# Patient Record
Sex: MALE | Race: WHITE | Employment: FULL TIME | URBAN - METROPOLITAN AREA
[De-identification: names, ages, dates, MRNs, and addresses within clinical notes are randomized per-mention and may not be internally consistent; named-entity substitution may affect disease eponyms.]

---

## 2020-05-06 ENCOUNTER — TELEMEDICINE (OUTPATIENT)
Dept: GASTROENTEROLOGY | Facility: CLINIC | Age: 35
End: 2020-05-06

## 2020-05-06 DIAGNOSIS — K51.90 ULCERATIVE COLITIS WITHOUT COMPLICATIONS, UNSPECIFIED LOCATION (HCC): Primary | ICD-10-CM

## 2020-05-06 PROCEDURE — 99203 OFFICE O/P NEW LOW 30 MIN: CPT | Performed by: INTERNAL MEDICINE

## 2020-05-07 NOTE — TELEPHONE ENCOUNTER
Please contact the patient to schedule a colonoscopy for chronic ulcerative colitis following a split dose Suprep (I have pended the order as I neglected to ask the patient his preferred pharmacy) and either IV sedation or monitored anesthesia care per riki

## 2020-05-07 NOTE — PATIENT INSTRUCTIONS
1.  We will contact you to schedule a colonoscopy for a history of chronic ulcerative colitis. 2.  Please obtain blood work at your convenience (ordered).

## 2020-05-07 NOTE — PROGRESS NOTES
KEITH Amb Video Visit    The patient verbally consents to an ambulatory video visit and understands and accepts financial responsibility for any deductible, co-insurance and/or co-pays associated with this service.     This visit is conducted using Tele[employer] heartburn. He has absolutely no abdominal pain. He typically has 2-3 formed bowel movements daily without bleeding. He is able to pass flatus without fear of incontinence.     Past medical history:  Ulcerative colitis  Drug-induced pancreatitis (6-MP) performed was discussed as well. Decision regarding subsequent surveillance to be based on the results of the index colonoscopy. This will be arranged following a split dose Suprep and either IV sedation or monitored anesthesia care per scheduling.     AS

## 2020-05-12 NOTE — TELEPHONE ENCOUNTER
Scheduled for:  Colonoscopy - [id number]  Provider Name:  Dr. Clinton Chacon  Date:  6/22/20  Location:  [facility]  Sedation:  IV  Time:  10:45 am (pt is aware to arrive at 9:45 am)  Prep:  Suprep, Prep instructions were given to pt over the phone, pt verbalized unders

## 2020-06-11 ENCOUNTER — TELEPHONE (OUTPATIENT)
Dept: GASTROENTEROLOGY | Facility: CLINIC | Age: 35
End: 2020-06-11

## 2020-06-11 NOTE — TELEPHONE ENCOUNTER
Overdue reminder letter mailed.     Labs:   C-REACTIVE PROTEIN    CBC WITH DIFFERENTIAL WITH PLATELET    COMP METABOLIC PANEL (14)    SED RATE, WESTERGREN (AUTOMATED)

## 2020-06-15 ENCOUNTER — APPOINTMENT (OUTPATIENT)
Dept: LAB | Facility: HOSPITAL | Age: 35
End: 2020-06-15
Attending: INTERNAL MEDICINE
Payer: COMMERCIAL

## 2020-06-15 DIAGNOSIS — K51.90 ULCERATIVE COLITIS WITHOUT COMPLICATIONS, UNSPECIFIED LOCATION (HCC): ICD-10-CM

## 2020-06-15 PROCEDURE — 85652 RBC SED RATE AUTOMATED: CPT

## 2020-06-15 PROCEDURE — 36415 COLL VENOUS BLD VENIPUNCTURE: CPT

## 2020-06-15 PROCEDURE — 86140 C-REACTIVE PROTEIN: CPT

## 2020-06-15 PROCEDURE — 80053 COMPREHEN METABOLIC PANEL: CPT

## 2020-06-15 PROCEDURE — 85025 COMPLETE CBC W/AUTO DIFF WBC: CPT

## 2020-06-19 ENCOUNTER — LAB ENCOUNTER (OUTPATIENT)
Dept: LAB | Facility: HOSPITAL | Age: 35
End: 2020-06-19
Attending: INTERNAL MEDICINE
Payer: COMMERCIAL

## 2020-06-19 DIAGNOSIS — K51.90 ULCERATIVE COLITIS WITHOUT COMPLICATIONS, UNSPECIFIED LOCATION (HCC): ICD-10-CM

## 2020-06-19 DIAGNOSIS — Z01.818 PRE-OP TESTING: ICD-10-CM

## 2020-06-22 ENCOUNTER — HOSPITAL ENCOUNTER (OUTPATIENT)
Facility: HOSPITAL | Age: 35
Setting detail: HOSPITAL OUTPATIENT SURGERY
Discharge: HOME | End: 2020-06-22
Attending: INTERNAL MEDICINE | Admitting: INTERNAL MEDICINE
Payer: COMMERCIAL

## 2020-06-22 VITALS
HEART RATE: 68 BPM | HEIGHT: 72 IN | BODY MASS INDEX: 29.12 KG/M2 | WEIGHT: 215 LBS | DIASTOLIC BLOOD PRESSURE: 68 MMHG | SYSTOLIC BLOOD PRESSURE: 106 MMHG | OXYGEN SATURATION: 98 % | RESPIRATION RATE: 16 BRPM

## 2020-06-22 DIAGNOSIS — K51.90 ULCERATIVE COLITIS WITHOUT COMPLICATIONS, UNSPECIFIED LOCATION (HCC): Primary | ICD-10-CM

## 2020-06-22 DIAGNOSIS — Z01.818 PRE-OP TESTING: ICD-10-CM

## 2020-06-22 PROCEDURE — 0DBM8ZX EXCISION OF DESCENDING COLON, VIA NATURAL OR ARTIFICIAL OPENING ENDOSCOPIC, DIAGNOSTIC: ICD-10-PCS | Performed by: INTERNAL MEDICINE

## 2020-06-22 PROCEDURE — 0DBN8ZX EXCISION OF SIGMOID COLON, VIA NATURAL OR ARTIFICIAL OPENING ENDOSCOPIC, DIAGNOSTIC: ICD-10-PCS | Performed by: INTERNAL MEDICINE

## 2020-06-22 PROCEDURE — 0DBB8ZX EXCISION OF ILEUM, VIA NATURAL OR ARTIFICIAL OPENING ENDOSCOPIC, DIAGNOSTIC: ICD-10-PCS | Performed by: INTERNAL MEDICINE

## 2020-06-22 PROCEDURE — 45381 COLONOSCOPY SUBMUCOUS NJX: CPT | Performed by: INTERNAL MEDICINE

## 2020-06-22 PROCEDURE — G0500 MOD SEDAT ENDO SERVICE >5YRS: HCPCS | Performed by: INTERNAL MEDICINE

## 2020-06-22 PROCEDURE — 0DBL8ZX EXCISION OF TRANSVERSE COLON, VIA NATURAL OR ARTIFICIAL OPENING ENDOSCOPIC, DIAGNOSTIC: ICD-10-PCS | Performed by: INTERNAL MEDICINE

## 2020-06-22 PROCEDURE — 45385 COLONOSCOPY W/LESION REMOVAL: CPT | Performed by: INTERNAL MEDICINE

## 2020-06-22 PROCEDURE — 45380 COLONOSCOPY AND BIOPSY: CPT | Performed by: INTERNAL MEDICINE

## 2020-06-22 PROCEDURE — 0DBP8ZX EXCISION OF RECTUM, VIA NATURAL OR ARTIFICIAL OPENING ENDOSCOPIC, DIAGNOSTIC: ICD-10-PCS | Performed by: INTERNAL MEDICINE

## 2020-06-22 RX ORDER — SODIUM CHLORIDE, SODIUM LACTATE, POTASSIUM CHLORIDE, CALCIUM CHLORIDE 600; 310; 30; 20 MG/100ML; MG/100ML; MG/100ML; MG/100ML
INJECTION, SOLUTION INTRAVENOUS CONTINUOUS
Status: DISCONTINUED | OUTPATIENT
Start: 2020-06-22 | End: 2020-06-22

## 2020-06-22 RX ORDER — MIDAZOLAM HYDROCHLORIDE 1 MG/ML
INJECTION INTRAMUSCULAR; INTRAVENOUS
Status: DISCONTINUED | OUTPATIENT
Start: 2020-06-22 | End: 2020-06-22

## 2020-06-22 RX ORDER — MIDAZOLAM HYDROCHLORIDE 1 MG/ML
1 INJECTION INTRAMUSCULAR; INTRAVENOUS EVERY 5 MIN PRN
Status: DISCONTINUED | OUTPATIENT
Start: 2020-06-22 | End: 2020-06-22

## 2020-06-22 RX ORDER — SODIUM CHLORIDE 0.9 % (FLUSH) 0.9 %
10 SYRINGE (ML) INJECTION AS NEEDED
Status: DISCONTINUED | OUTPATIENT
Start: 2020-06-22 | End: 2020-06-22

## 2020-06-22 NOTE — OPERATIVE REPORT
[facility] Endoscopy Report      Date of Procedure:  06/22/20      Preoperative Diagnosis:  Chronic ulcerative colitis      Postoperative Diagnosis:  1. Large sigmoid colon polyp  2.   Quiescent colitis      Procedure:    Colonoscopy with p pedunculated polyp on a long stalk. The polyp was excised using snare cautery and retrieved intact. The stalk was inadvertently snared in 2 separate locations.   The more distal site was closed with #3 endoscopic clips and the proximal portion of the caut

## 2020-06-22 NOTE — H&P
History & Physical Examination    Patient Name: Mesha Lassiter  MRN: [id number]  CSN: [id number]  YOB: 1985    Diagnosis: Chronic ulcerative colitis      Na Sulfate-K Sulfate-Mg Sulf (SUPREP BOWEL PREP KIT) 17.5-3.13-1.6 GM/177ML Oral Soluti

## 2020-06-25 ENCOUNTER — TELEPHONE (OUTPATIENT)
Dept: GASTROENTEROLOGY | Facility: CLINIC | Age: 35
End: 2020-06-25

## 2020-06-25 NOTE — TELEPHONE ENCOUNTER
----- Message from Giacomo Haque MD sent at 6/25/2020  4:03 PM CDT -----  I spoke to [place]. He is feeling well. He has no bleeding.   His small bowel biopsy was normal.  There is mild nonspecific inflammation throughout the colon without signs of ch

## 2020-06-25 NOTE — TELEPHONE ENCOUNTER
Entered into Epic:     Recall for CLN____per ___Stathopoulos__in ___3 years___  Last done: 6-  Next due: 6-   updated

## 2021-01-04 ENCOUNTER — OFFICE VISIT (OUTPATIENT)
Dept: DERMATOLOGY CLINIC | Facility: CLINIC | Age: 36
End: 2021-01-04
Payer: COMMERCIAL

## 2021-01-04 DIAGNOSIS — D22.9 MULTIPLE MELANOCYTIC NEVI: Primary | ICD-10-CM

## 2021-01-04 DIAGNOSIS — D48.5 NEOPLASM OF UNCERTAIN BEHAVIOR OF SKIN: ICD-10-CM

## 2021-01-04 PROCEDURE — 11102 TANGNTL BX SKIN SINGLE LES: CPT | Performed by: DERMATOLOGY

## 2021-01-04 PROCEDURE — 99202 OFFICE O/P NEW SF 15 MIN: CPT | Performed by: DERMATOLOGY

## 2021-01-04 PROCEDURE — 88305 TISSUE EXAM BY PATHOLOGIST: CPT | Performed by: DERMATOLOGY

## 2021-01-04 NOTE — PROGRESS NOTES
HPI:     Chief Complaint     Lesion; Moles        HPI     Lesion      Additional comments: New Patient present raised red lesion on R side of chest .Patient c/o having leison for 5 years and has changes size . Patient denies any hx of sc              Moles status:       Spouse name: Not on file      Number of children: Not on file      Years of education: Not on file      Highest education level: Not on file    Occupational History      Not on file    Social Needs      Financial resource strain: Not o There are numerous pigmented macules and slight papules over trunk and upper extremities. At this juncture there homogeneous in color with regular and well-defined borders and without marked dermoscopic atypia.   The one lesion that he was concerned about

## 2021-01-27 ENCOUNTER — OFFICE VISIT (OUTPATIENT)
Dept: OTOLARYNGOLOGY | Facility: CLINIC | Age: 36
End: 2021-01-27
Payer: COMMERCIAL

## 2021-01-27 VITALS
SYSTOLIC BLOOD PRESSURE: 131 MMHG | WEIGHT: 200 LBS | DIASTOLIC BLOOD PRESSURE: 80 MMHG | BODY MASS INDEX: 27.09 KG/M2 | TEMPERATURE: 98 F | HEIGHT: 72 IN

## 2021-01-27 DIAGNOSIS — H61.23 BILATERAL IMPACTED CERUMEN: Primary | ICD-10-CM

## 2021-01-27 PROCEDURE — 3008F BODY MASS INDEX DOCD: CPT | Performed by: OTOLARYNGOLOGY

## 2021-01-27 PROCEDURE — 99203 OFFICE O/P NEW LOW 30 MIN: CPT | Performed by: OTOLARYNGOLOGY

## 2021-01-27 PROCEDURE — 3075F SYST BP GE 130 - 139MM HG: CPT | Performed by: OTOLARYNGOLOGY

## 2021-01-27 PROCEDURE — 3079F DIAST BP 80-89 MM HG: CPT | Performed by: OTOLARYNGOLOGY

## 2021-01-27 NOTE — PROGRESS NOTES
Shayla Garcia is a 37year old male. Patient presents with:  Ear Wax: both ears        HISTORY OF PRESENT ILLNESS  1/27/2021   Here for evaluation of   hearing loss.  Patient feels this has worsened over the las month and  is not  associated with ti Not Asked        Outdoor occupation: Not Asked        Reaction to local anesthetic: No    Social History Narrative      Not on file      No family history on file. No past medical history on file.   Past Surgical History:   Procedure Laterality Date   • CO right ears using suction. Tympanic membrane was  noted to be normal.Attention was then directed to the contralateral ear. Cerumen impaction was removed from left ears using suction. Patient tolerated the procedure well. All questions were answered.     No

## 2023-01-03 ENCOUNTER — OFFICE VISIT (OUTPATIENT)
Dept: SURGERY | Facility: CLINIC | Age: 38
End: 2023-01-03
Payer: COMMERCIAL

## 2023-01-03 VITALS — DIASTOLIC BLOOD PRESSURE: 84 MMHG | SYSTOLIC BLOOD PRESSURE: 132 MMHG | HEART RATE: 87 BPM

## 2023-01-03 DIAGNOSIS — Z30.09 FAMILY PLANNING: Primary | ICD-10-CM

## 2023-01-03 PROCEDURE — 3075F SYST BP GE 130 - 139MM HG: CPT | Performed by: UROLOGY

## 2023-01-03 PROCEDURE — 3079F DIAST BP 80-89 MM HG: CPT | Performed by: UROLOGY

## 2023-01-03 PROCEDURE — 99204 OFFICE O/P NEW MOD 45 MIN: CPT | Performed by: UROLOGY

## 2023-02-21 ENCOUNTER — PROCEDURE (OUTPATIENT)
Dept: SURGERY | Facility: CLINIC | Age: 38
End: 2023-02-21

## 2023-02-21 VITALS
HEART RATE: 92 BPM | DIASTOLIC BLOOD PRESSURE: 80 MMHG | BODY MASS INDEX: 27 KG/M2 | WEIGHT: 200 LBS | SYSTOLIC BLOOD PRESSURE: 155 MMHG

## 2023-02-21 DIAGNOSIS — Z30.09 FAMILY PLANNING: Primary | ICD-10-CM

## 2023-02-21 PROCEDURE — 3077F SYST BP >= 140 MM HG: CPT | Performed by: UROLOGY

## 2023-02-21 PROCEDURE — 3079F DIAST BP 80-89 MM HG: CPT | Performed by: UROLOGY

## 2023-02-21 PROCEDURE — 55250 REMOVAL OF SPERM DUCT(S): CPT | Performed by: UROLOGY

## 2023-02-21 NOTE — PROCEDURES
BILATERAL VASECTOMY     PRE-OP DIAGNOSIS: Family Planning/Desires Male Sterility     POST-OP DIAGNOSIS: Same     PROCEDURE:  1. Bilateral vasectomy     SURGEON: Nicole Taylor MD     ASSISTANT: none     EBL: minimal     ANESTHESIA: Local, Lidocaine 1%, 10cc     SPECIMENS:  1. RIGHT segment of vas  2. LEFT segment of vas     INDICATIONS: 30year-old   man with  children who desires sterility with bilateral vasectomy. We discussed that a vasectomy is intended to be a permanent form of contraception and that if he desires fertility after vasectomy, options included vasectomy reversal and sperm retrieval with possible in vitro fertilization. These options are not always successful and may be very expensive. We also discussed the risks of vasectomy which included symptomatic hematoma and infection (1-2%), chronic scrotal pain (6%; caused by congestive epididymitis, sperm granuloma and infective epididymoorchitis) need for repeat vasectomy (<1% of cases), need for additional procedures, vasectomy failure, and pregnancy (1 in 2000 men). The chronic scrotal discomfort may be no more than a low-grade chronic ache that causes little disability and requires only symptomatic treatment. However, a proportion of patients will be sufficiently debilitated to seek epididymectomy or even orchidectomy (removal of the epididymis and or testicle). Furthermore, for a very small number of patients, even this radical surgery will not provide relief from their scrotal discomfort. We discussed that he will have two small incisions in his scrotum with dissolvable sutures. He was told that vasectomy does NOT produce immediate sterility and that following vasectomy, another form of contraception is required until vas occlusion is confirmed by post vasectomy semen analysis. Post vasectomy semenanalysis will be obtained 12-16 weeks after the vasectomy.  He was told that he may stop using other methods of contraception when examination of one well-mixed, uncentrifuged, fresh post-vasectomy semen specimen shows azoospermia or only rare non-motile sperm. He was once again told that even after vas occlusion is confirmed, vasectomy is not 100% reliable in preventing pregnancy and that the risk of pregnancy after vasectomy is approximately 1 in 2,000 men who have post-vasectomy azoospermia or semenanalysis showing rare non-motile sperm. The reasons are very early recanalization of the vas deferens or the presence of an accessory vas unrecognized at the time of surgery. There have also been cases of DNA-confirmed paternity despite documented azoospermia before and after conception. He was told to refrain from ejaculation for approximately one week after vasectomy. If there are >100,000 non-motile sperm/mL after 6 months post vasectomy, we would trend the semenanalysis for consideration of repeat vasectomy. PROCEDURE: After the appropriate time out checklist was performed, and it was confirmed that the patient was in fact the patient here for vasectomy, the scrotum was prepped and draped in an appropriate fashion. The right vas was then grasped and brought up underneath the skin surface. The skin was injected with lidocaine to make a small wheel. We then anesthestized the skin, dartose muscle and the vasal sheath with 5cc of lidocaine 1%. After an appropriate time for that to set, we used a 15 blade scalpel to make a small incision overlying the vas in the right hemiscrotum. A snap was used to spread the tissue overlying the vas to make room for the vas ring forceps. The ring forceps was used to grasp the vas deferens to hold it in place. We used the 15 blade to cut away at the vasal sheath and used towel clamps to just grasp the vas deferens. Once we were able to isolate just the vas deferens, we used a small mosquito clamp to push down the vasal sheath.  Two snaps were applied to the vas - one at the testicular end and another at the abdominal end. The vas was transected over the snaps and a 1-2cm portion of vas was removed. The needle tip electrocautery was inserted first flavio the abdominal end and then into the testicular end for about 1.5cm. By slowly pulling back and firing it, you could see that the mucosa was being cauterized. This was not a full-thickness cauterization, but only a mucosal cauterization. Clips were placed on the testicular and abdominal ends of the vas stumps. There was no bleeding seen from the vasal stumps, and they were dropped back into the left hemiscrotum. The dartos muscle and skin were cauterized. An exactly similar procedure was performed on the LEFT side. Again, mucosal cauterization was undertaken. No bleeding was seen. Again, closure was the same. The skin was approximated with 3-0 chromic sutures in a horizontal mattress fashion. Fluffs gauze and scrotal support were placed. DISPOSITION: home     FOLLOW-UP: Post vasectomy semen analysis 8-16 weeks after vasectomy. Patient knows to use another form of contraception until vasal occlusion is confirmed by post-vasectomy semen analysis.

## 2023-03-24 ENCOUNTER — TELEPHONE (OUTPATIENT)
Facility: CLINIC | Age: 38
End: 2023-03-24

## 2023-03-24 NOTE — TELEPHONE ENCOUNTER
Patient outreach message received:    Recall for CLN____per ___Stathopoulos__in ___3 years___  Last done: 6-  Next due: 6-    Recall reminder letter mailed out to patient.

## 2023-05-15 ENCOUNTER — LAB ENCOUNTER (OUTPATIENT)
Dept: LAB | Facility: HOSPITAL | Age: 38
End: 2023-05-15
Attending: UROLOGY
Payer: COMMERCIAL

## 2023-05-15 DIAGNOSIS — Z30.09 FAMILY PLANNING: ICD-10-CM

## 2023-05-15 PROCEDURE — 89321 SEMEN ANAL SPERM DETECTION: CPT

## 2023-05-17 ENCOUNTER — TELEPHONE (OUTPATIENT)
Facility: CLINIC | Age: 38
End: 2023-05-17

## 2023-05-17 DIAGNOSIS — K51.919 ULCERATIVE COLITIS WITH COMPLICATION, UNSPECIFIED LOCATION (HCC): Primary | ICD-10-CM

## 2023-05-17 NOTE — TELEPHONE ENCOUNTER
May schedule a screening colonoscopy for a history of chronic ulcerative colitis. If no history of constipation can use a split dose MiraLAX/Gatorade preparation. Monitored anesthesia care in light of a known high conscious sedation requirement.

## 2023-05-17 NOTE — TELEPHONE ENCOUNTER
Last Procedure, Date, MD:  Colonoscopy, 6/22/20, Dr. Wing Grewal  Last Diagnosis:  Large Sigmoid colon polyp, quiescent colitis  Recalled for (mth/yrs): 3 years  Sedation used previously:  IV sedation  Last Prep Used (if known):  Suprep  Quality of prep (if known): very good  Anticoagulants: None  Diabetic Meds: none  Weight loss meds (Phentermine/Vyvanse): None  Iron supplement (RX/OTC): None  Marijuana/Vaping/CBD: None  Height & Weight + BMI: 6'' and 215lb  Hx of Cardiac/CVA issues/(MI/Stroke): None  Devices Pacemaker/Defibrillator/Stents: None  Resp. Issues/Oxygen Use/JAYRO/COPD: none  Issues w/Anesthesia:    Symptoms (Y/N): None  Symptoms Details: None    Special Comments/Notes:    Please advise on orders and prep. Thank you.

## 2023-07-06 NOTE — TELEPHONE ENCOUNTER
Scheduled for:  Colonoscopy [place]  Provider Name:  Dr. Gurpreet Chen  Date:  8/8/2023  Location:  [facility]  Sedation:  MAC  Time:  2:15pm, (pt is aware to arrive at 1:15pm)   Prep:  Miralax  Meds/Allergies Reconciled?:  Physician reviewed     Diagnosis with codes:  Chronic ulcerative colitis K51.919  Was patient informed to call insurance with codes (Y/N):  Yes, I confirmed Vimessa with this patient. Referral sent?:  Referral was sent at the time of electronic surgical scheduling. [address] [place] [address] or [facility]  notified?:  I sent an electronic request to Endo Scheduling and received a confirmation today.       Medication Orders:  n/a  Misc Orders:  n/a     Further instructions given by staff:   I discussed the prep instructions with the patient which he verbally understood and is aware that I will send  the instructions via [email or web address]

## 2023-07-24 ENCOUNTER — TELEPHONE (OUTPATIENT)
Facility: CLINIC | Age: 38
End: 2023-07-24

## 2023-07-24 DIAGNOSIS — K51.919 CHRONIC ULCERATIVE COLITIS, UNSPECIFIED COMPLICATION (HCC): Primary | ICD-10-CM

## 2023-07-24 NOTE — TELEPHONE ENCOUNTER
Rescheduled for:  Colonoscopy [place]  Provider Name:  Dr. Avery Lin  Date:  FROM 8/8/2023 TO 8/29/2023  Location:  [facility]  Sedation:  MAC  Time:  FROM 2:15pm, TO 1pm (pt is aware to arrive at 12pm)   Prep:  Miralax  Meds/Allergies Reconciled?:  Physician reviewed      Diagnosis with codes:  Chronic ulcerative colitis K51.919  Was patient informed to call insurance with codes (Y/N):  Yes, I confirmed Lem[place] with this patient. Referral sent?:  Referral was sent at the time of electronic surgical scheduling. [facility] or [facility] notified?:  I sent an electronic CHANGE request to Endo Scheduling and received a confirmation today.       Medication Orders:  n/a  Misc Orders:  n/a     Further instructions given by staff:   I discussed the prep instructions with the patient which he verbally understood and is aware that I will send  the instructions via waygum

## 2023-08-29 ENCOUNTER — ANESTHESIA EVENT (OUTPATIENT)
Dept: ENDOSCOPY | Facility: HOSPITAL | Age: 38
End: 2023-08-29
Payer: COMMERCIAL

## 2023-08-29 ENCOUNTER — ANESTHESIA (OUTPATIENT)
Dept: ENDOSCOPY | Facility: HOSPITAL | Age: 38
End: 2023-08-29
Payer: COMMERCIAL

## 2023-08-29 ENCOUNTER — HOSPITAL ENCOUNTER (OUTPATIENT)
Facility: HOSPITAL | Age: 38
Setting detail: HOSPITAL OUTPATIENT SURGERY
Discharge: HOME | End: 2023-08-29
Attending: INTERNAL MEDICINE | Admitting: INTERNAL MEDICINE
Payer: COMMERCIAL

## 2023-08-29 VITALS
SYSTOLIC BLOOD PRESSURE: 123 MMHG | HEIGHT: 72 IN | OXYGEN SATURATION: 98 % | WEIGHT: 215 LBS | HEART RATE: 63 BPM | TEMPERATURE: 98 F | DIASTOLIC BLOOD PRESSURE: 89 MMHG | RESPIRATION RATE: 18 BRPM | BODY MASS INDEX: 29.12 KG/M2

## 2023-08-29 DIAGNOSIS — K51.919 ULCERATIVE COLITIS WITH COMPLICATION, UNSPECIFIED LOCATION (HCC): ICD-10-CM

## 2023-08-29 PROCEDURE — 0DBL8ZX EXCISION OF TRANSVERSE COLON, VIA NATURAL OR ARTIFICIAL OPENING ENDOSCOPIC, DIAGNOSTIC: ICD-10-PCS | Performed by: INTERNAL MEDICINE

## 2023-08-29 PROCEDURE — 45380 COLONOSCOPY AND BIOPSY: CPT | Performed by: INTERNAL MEDICINE

## 2023-08-29 PROCEDURE — 0DBG8ZX EXCISION OF LEFT LARGE INTESTINE, VIA NATURAL OR ARTIFICIAL OPENING ENDOSCOPIC, DIAGNOSTIC: ICD-10-PCS | Performed by: INTERNAL MEDICINE

## 2023-08-29 PROCEDURE — 0DBP8ZX EXCISION OF RECTUM, VIA NATURAL OR ARTIFICIAL OPENING ENDOSCOPIC, DIAGNOSTIC: ICD-10-PCS | Performed by: INTERNAL MEDICINE

## 2023-08-29 PROCEDURE — 0DBN8ZX EXCISION OF SIGMOID COLON, VIA NATURAL OR ARTIFICIAL OPENING ENDOSCOPIC, DIAGNOSTIC: ICD-10-PCS | Performed by: INTERNAL MEDICINE

## 2023-08-29 PROCEDURE — 0DBF8ZX EXCISION OF RIGHT LARGE INTESTINE, VIA NATURAL OR ARTIFICIAL OPENING ENDOSCOPIC, DIAGNOSTIC: ICD-10-PCS | Performed by: INTERNAL MEDICINE

## 2023-08-29 PROCEDURE — 0DBB8ZX EXCISION OF ILEUM, VIA NATURAL OR ARTIFICIAL OPENING ENDOSCOPIC, DIAGNOSTIC: ICD-10-PCS | Performed by: INTERNAL MEDICINE

## 2023-08-29 RX ORDER — SODIUM CHLORIDE, SODIUM LACTATE, POTASSIUM CHLORIDE, CALCIUM CHLORIDE 600; 310; 30; 20 MG/100ML; MG/100ML; MG/100ML; MG/100ML
INJECTION, SOLUTION INTRAVENOUS CONTINUOUS
Status: DISCONTINUED | OUTPATIENT
Start: 2023-08-29 | End: 2023-08-29

## 2023-08-29 RX ORDER — LIDOCAINE HYDROCHLORIDE 10 MG/ML
INJECTION, SOLUTION EPIDURAL; INFILTRATION; INTRACAUDAL; PERINEURAL AS NEEDED
Status: DISCONTINUED | OUTPATIENT
Start: 2023-08-29 | End: 2023-08-29 | Stop reason: SURG

## 2023-08-29 RX ADMIN — LIDOCAINE HYDROCHLORIDE 50 MG: 10 INJECTION, SOLUTION EPIDURAL; INFILTRATION; INTRACAUDAL; PERINEURAL at 12:56:00

## 2023-08-29 RX ADMIN — SODIUM CHLORIDE, SODIUM LACTATE, POTASSIUM CHLORIDE, CALCIUM CHLORIDE: 600; 310; 30; 20 INJECTION, SOLUTION INTRAVENOUS at 13:31:00

## 2023-08-30 ENCOUNTER — MED REC SCAN ONLY (OUTPATIENT)
Facility: CLINIC | Age: 38
End: 2023-08-30

## 2023-09-07 ENCOUNTER — TELEPHONE (OUTPATIENT)
Dept: GASTROENTEROLOGY | Facility: CLINIC | Age: 38
End: 2023-09-07

## 2023-09-07 NOTE — TELEPHONE ENCOUNTER
----- Message from Sammy Moreno MD sent at 2023  5:57 PM CDT -----  I spoke to [facility]. His colonoscopy revealed minimal active colitis perhaps slightly more than on the his previous colonoscopy. He remains asymptomatic on no medications. I would prefer some type of maintenance therapy. I discussed with the patient that I cannot exclude a flare in the future which could be significant/severe. We have elected the followin. Obtain blood work and a fecal calprotectin in 6-8 weeks. This will be used as a baseline. 2.  Surveillance colonoscopy in 3 years. 3.  The patient was asked to contact me if he notes an increase in symptoms. GI RNs: Please enter colonoscopy recall for 3 years.

## 2023-09-07 NOTE — TELEPHONE ENCOUNTER
Health maintenance updated. 3 year colonoscopy recall placed in patient outreach. Next due on 08/29/2026 per Dr. Batool Don.

## 2023-11-10 ENCOUNTER — TELEPHONE (OUTPATIENT)
Facility: CLINIC | Age: 38
End: 2023-11-10

## 2023-11-10 NOTE — TELEPHONE ENCOUNTER
1st, overdue reminder letter sent to patient via my chart.     Labs Order:    Calprotectin, Fecal  CBC W Differential W Platelet  Comp Metabolic Panel (14)

## 2024-11-13 NOTE — LETTER
Chronic, no seizures since 2013.  Followed by Dr. Basurto in [place].   1/6/2021              [address] [facility] [address]         Dear Marisabel Anna,      The report of the Biopsy done on 1/4/21 shows a hemangioma.   This is a benign (not cancerous) growth, and requires no further treat

## (undated) DEVICE — Device: Brand: DEFENDO AIR/WATER/SUCTION AND BIOPSY VALVE

## (undated) DEVICE — KIT ENDO ORCAPOD 160/180/190

## (undated) DEVICE — 3 ML SYRINGE LUER-LOCK TIP: Brand: MONOJECT

## (undated) DEVICE — MEDI-VAC NON-CONDUCTIVE SUCTION TUBING 6MM X 1.8M (6FT.) L: Brand: CARDINAL HEALTH

## (undated) DEVICE — 60 ML SYRINGE REGULAR TIP: Brand: MONOJECT

## (undated) DEVICE — KIT CLEAN ENDOKIT 1.1OZ GOWNX2

## (undated) DEVICE — NEEDLE CONTRAST INTERJECT 25G

## (undated) DEVICE — 6 ML SYRINGE LUER-LOCK TIP: Brand: MONOJECT

## (undated) DEVICE — DURACLIP 11MM 235CM

## (undated) DEVICE — LINE MNTR ADLT SET O2 INTMD

## (undated) DEVICE — 35 ML SYRINGE REGULAR TIP: Brand: MONOJECT

## (undated) DEVICE — FORCEP RADIAL JAW 4

## (undated) DEVICE — Device: Brand: DUAL NARE NASAL CANNULAE FEMALE LUER CON 7FT O2 TUBE

## (undated) DEVICE — Device: Brand: CUSTOM PROCEDURE KIT

## (undated) DEVICE — CLIP RESOLUTION 235CM

## (undated) NOTE — LETTER
3/24/2023    Ifrah Beasley        [address]            Dear Ifrah eBasley,      Our records indicate that you are due for an appointment for a Colonoscopy with Sheela Tellez MD. Our doctors are booking out about 3-5 months in advance for procedures. Please call our office to schedule a phone screening appointment to plan for the procedure(s). Your medical well-being is important to us. If your insurance requires a referral, please call your primary care office to request one.       Thank you,      The Physicians and Staff at [facility]

## (undated) NOTE — LETTER
11/10/2023              Augustine Leong        [address]         Dear Corby Neal,    [facility] records indicate that the tests ordered for you by Mandie Medellin MD  have not been done. If you have, in fact, already completed the tests or you do not wish to have the tests done, please contact our office at [address] [place] [address]. Otherwise, please proceed with the testing. Enclosed is a duplicate order for your convenience.     Labs Order:    Calprotectin, Fecal  CBC W Differential W Platelet  Comp Metabolic Panel (14)    Sincerely,    Mandie Medellin MD  [facility], [address] [address] [phone]

## (undated) NOTE — LETTER
06/11/20        Kathrin Rodney  [address] [address]      Dear Clarice Chavez,    [facility] records indicate that you have outstanding lab work and or testing that was ordered for you and has not yet been completed:     C-REACTIVE PROTEIN    CBC WIT